# Patient Record
Sex: FEMALE | Race: WHITE | NOT HISPANIC OR LATINO | Employment: OTHER | URBAN - METROPOLITAN AREA
[De-identification: names, ages, dates, MRNs, and addresses within clinical notes are randomized per-mention and may not be internally consistent; named-entity substitution may affect disease eponyms.]

---

## 2017-03-22 NOTE — PATIENT DISCUSSION
MODERATE DRY EYES: MUCH IMPROVED ON SOOTHE ARTIFICIAL TEARS BID &ndash; QID4-6X A DAY, OU AND THE DAILY INTAKE OF OMEGA-3 DHA/EPA FATTY ACIDS TO HELP RELIEVE SYMPTOMS. ADD NIGHTLY LUBRICATING OINTMENT OR GEL.  WILL CONSIDER RESTASIS OR PUNCTAL PLUGS

## 2017-03-22 NOTE — PATIENT DISCUSSION
Macular Drusen Counseling: I have explained the diagnosis of macular drusen and the role of drusen in development of macular degeneration. AREDS 2 multivitamin may have some benefit, as well as UV protection when outdoors and a nutritious diet, especially green leafy vegetables and fish to minimize the risk of developing macular degeneration. The patient was advised of the importance of annual dilated eye exams.  Return for follow-up as scheduled

## 2017-03-22 NOTE — PATIENT DISCUSSION
MACULAR DRUSEN, OU: PRESCRIBE AREDS 2 VITAMINS AND INCREASE UV PROTECTION. RETURN FOR FOLLOW-UP AS SCHEDULED.

## 2017-09-26 NOTE — PATIENT DISCUSSION
New Prescription: erythromycin (erythromycin): ointment: 5 mg/gram (0.5 %) a small amount at bedtime on eyelid 09-

## 2017-09-26 NOTE — PATIENT DISCUSSION
BLEPHARITIS, OU: PRESCRIBE WARM COMPRESSES AND EYELID SCRUBS QD-BID, ARTIFICIAL TEARS BID-QID, THE DAILY INTAKE OF OMEGA-3 FATTY ACIDS AND ERYTHROMYCIN JULISA QHS X2 WEEKS. WILL CONSIDER LIPIFLOW TREATMENT NEXT VISIT IF NOT RESPONSIVE TO TREATMENT OR IF SYMPTOMS PERSIST. RETURN FOR FOLLOW-UP AS SCHEDULED.

## 2018-10-03 NOTE — PATIENT DISCUSSION
New Prescription: erythromycin (erythromycin): ointment: 5 mg/gram (0.5 %) a small amount at bedtime as directed into left eye 10-

## 2018-10-03 NOTE — PATIENT DISCUSSION
BLEPHARITIS, OU: PRESCRIBE WARM COMPRESSES AND EYELID SCRUBS QD-BID, ARTIFICIAL TEARS BID-QID, THE DAILY INTAKE OF OMEGA-3 FATTY ACIDS CONTINUE ERYTHROMYCIN JULISA QHS X2 WEEKS. WILL CONSIDER LIPIFLOW TREATMENT NEXT VISIT IF NOT RESPONSIVE TO TREATMENT OR IF SYMPTOMS PERSIST. RETURN FOR FOLLOW-UP AS SCHEDULED.

## 2018-11-28 NOTE — PATIENT DISCUSSION
POST OP YAG OD FOR PCO. PATIENT DOING WELL WITHOUT ANY SIGNS OF RETINAL HOLES OR TEARS. NEW SPEC RX GIVEN.

## 2019-02-13 ENCOUNTER — ESTABLISHED COMPREHENSIVE EXAM (OUTPATIENT)
Dept: URBAN - METROPOLITAN AREA CLINIC 38 | Facility: CLINIC | Age: 59
End: 2019-02-13

## 2019-02-13 DIAGNOSIS — H52.12: ICD-10-CM

## 2019-02-13 DIAGNOSIS — H52.201: ICD-10-CM

## 2019-02-13 DIAGNOSIS — H52.4: ICD-10-CM

## 2019-02-13 DIAGNOSIS — Z97.3: ICD-10-CM

## 2019-02-13 PROCEDURE — 92014 COMPRE OPH EXAM EST PT 1/>: CPT

## 2019-02-13 PROCEDURE — 92310-3 LEVEL 3 CONTACT LENS MANAGEMENT

## 2019-02-13 PROCEDURE — 92015 DETERMINE REFRACTIVE STATE: CPT

## 2019-02-13 PROCEDURE — 92310PDW PREMIUM SPECIALTY DAILY WEAR

## 2019-02-13 ASSESSMENT — VISUAL ACUITY
OD_CC: J3
OS_CC: 20/20
OD_CC: 20/20-2
OS_CC: J6

## 2019-02-13 ASSESSMENT — TONOMETRY
OD_IOP_MMHG: 16
OS_IOP_MMHG: 16

## 2019-06-12 NOTE — PATIENT DISCUSSION
CONJUNCTIVITIS (ALLERGIC), OU: PAZEO TO EXPENSIVE FOR PT. PRESCRIBED ZADITOR  BID OU. RETURN FOR FOLLOW-UP AS SCHEDULED.